# Patient Record
Sex: FEMALE | Race: WHITE | NOT HISPANIC OR LATINO | Employment: FULL TIME | URBAN - METROPOLITAN AREA
[De-identification: names, ages, dates, MRNs, and addresses within clinical notes are randomized per-mention and may not be internally consistent; named-entity substitution may affect disease eponyms.]

---

## 2017-03-30 ENCOUNTER — TRANSCRIBE ORDERS (OUTPATIENT)
Dept: ADMINISTRATIVE | Facility: HOSPITAL | Age: 47
End: 2017-03-30

## 2017-03-30 ENCOUNTER — HOSPITAL ENCOUNTER (OUTPATIENT)
Dept: RADIOLOGY | Facility: HOSPITAL | Age: 47
Discharge: HOME | End: 2017-03-30
Attending: INTERNAL MEDICINE
Payer: COMMERCIAL

## 2017-03-30 ENCOUNTER — APPOINTMENT (OUTPATIENT)
Dept: LAB | Facility: HOSPITAL | Age: 47
End: 2017-03-30
Attending: INTERNAL MEDICINE
Payer: COMMERCIAL

## 2017-03-30 DIAGNOSIS — R06.00 DYSPNEA, UNSPECIFIED TYPE: ICD-10-CM

## 2017-03-30 DIAGNOSIS — R06.02 SOB (SHORTNESS OF BREATH): ICD-10-CM

## 2017-03-30 LAB
ATRIAL RATE: 82 BPM
P AXIS: 42 DEGREES
PR INTERVAL: 152 MS
QRS AXIS: 5 DEGREES
QRSD INTERVAL: 80 MS
QT INTERVAL: 376 MS
QTC INTERVAL: 439 MS
T WAVE AXIS: 44 DEGREES
VENTRICULAR RATE: 82 BPM

## 2017-03-30 PROCEDURE — 71020 HB CHEST X-RAY 2VW FRONTAL&LATL: CPT

## 2017-03-30 PROCEDURE — 93005 ELECTROCARDIOGRAM TRACING: CPT

## 2017-04-06 ENCOUNTER — HOSPITAL ENCOUNTER (OUTPATIENT)
Dept: PULMONOLOGY | Facility: HOSPITAL | Age: 47
Discharge: HOME | End: 2017-04-06
Attending: INTERNAL MEDICINE
Payer: COMMERCIAL

## 2017-04-06 ENCOUNTER — HOSPITAL ENCOUNTER (OUTPATIENT)
Dept: NON INVASIVE DIAGNOSTICS | Facility: HOSPITAL | Age: 47
Discharge: HOME | End: 2017-04-06
Attending: INTERNAL MEDICINE
Payer: COMMERCIAL

## 2017-04-06 ENCOUNTER — GENERIC CONVERSION - ENCOUNTER (OUTPATIENT)
Dept: OTHER | Facility: OTHER | Age: 47
End: 2017-04-06

## 2017-04-06 ENCOUNTER — HOSPITAL ENCOUNTER (OUTPATIENT)
Dept: RADIOLOGY | Facility: HOSPITAL | Age: 47
Discharge: HOME | End: 2017-04-06
Attending: INTERNAL MEDICINE
Payer: COMMERCIAL

## 2017-04-06 DIAGNOSIS — R06.00 DYSPNEA, UNSPECIFIED TYPE: ICD-10-CM

## 2017-04-06 DIAGNOSIS — R06.02 SOB (SHORTNESS OF BREATH): ICD-10-CM

## 2017-04-06 PROCEDURE — 94060 EVALUATION OF WHEEZING: CPT

## 2017-04-06 PROCEDURE — 93306 TTE W/DOPPLER COMPLETE: CPT

## 2017-04-06 PROCEDURE — 71260 CT THORAX DX C+: CPT

## 2017-04-06 PROCEDURE — 94729 DIFFUSING CAPACITY: CPT

## 2017-04-06 PROCEDURE — 94726 PLETHYSMOGRAPHY LUNG VOLUMES: CPT

## 2017-04-06 PROCEDURE — 94760 N-INVAS EAR/PLS OXIMETRY 1: CPT

## 2017-04-06 RX ADMIN — IOHEXOL 85 ML: 350 INJECTION, SOLUTION INTRAVENOUS at 08:52

## 2017-11-01 ENCOUNTER — TRANSCRIBE ORDERS (OUTPATIENT)
Dept: ADMINISTRATIVE | Facility: HOSPITAL | Age: 47
End: 2017-11-01

## 2017-11-01 DIAGNOSIS — Z12.31 ENCOUNTER FOR SCREENING MAMMOGRAM FOR MALIGNANT NEOPLASM OF BREAST: Primary | ICD-10-CM

## 2017-12-27 ENCOUNTER — GENERIC CONVERSION - ENCOUNTER (OUTPATIENT)
Dept: OTHER | Facility: OTHER | Age: 47
End: 2017-12-27

## 2017-12-27 ENCOUNTER — HOSPITAL ENCOUNTER (OUTPATIENT)
Dept: RADIOLOGY | Facility: HOSPITAL | Age: 47
Discharge: HOME | End: 2017-12-27
Payer: COMMERCIAL

## 2017-12-27 DIAGNOSIS — Z12.31 ENCOUNTER FOR SCREENING MAMMOGRAM FOR MALIGNANT NEOPLASM OF BREAST: ICD-10-CM

## 2017-12-27 PROCEDURE — G0202 SCR MAMMO BI INCL CAD: HCPCS

## 2018-11-12 ENCOUNTER — TRANSCRIBE ORDERS (OUTPATIENT)
Dept: ADMINISTRATIVE | Facility: HOSPITAL | Age: 48
End: 2018-11-12

## 2018-11-12 DIAGNOSIS — Z12.39 SCREENING BREAST EXAMINATION: Primary | ICD-10-CM

## 2018-12-28 ENCOUNTER — HOSPITAL ENCOUNTER (OUTPATIENT)
Dept: RADIOLOGY | Facility: HOSPITAL | Age: 48
Discharge: HOME | End: 2018-12-28
Attending: INTERNAL MEDICINE
Payer: COMMERCIAL

## 2018-12-28 VITALS — BODY MASS INDEX: 26.43 KG/M2 | WEIGHT: 140 LBS | HEIGHT: 61 IN

## 2018-12-28 DIAGNOSIS — Z12.39 SCREENING BREAST EXAMINATION: ICD-10-CM

## 2018-12-28 PROCEDURE — 77067 SCR MAMMO BI INCL CAD: CPT

## 2019-06-21 ENCOUNTER — TRANSCRIBE ORDERS (OUTPATIENT)
Dept: ADMINISTRATIVE | Facility: HOSPITAL | Age: 49
End: 2019-06-21

## 2019-07-29 ENCOUNTER — OFFICE VISIT (OUTPATIENT)
Dept: AUDIOLOGY | Facility: CLINIC | Age: 49
End: 2019-07-29
Payer: COMMERCIAL

## 2019-07-29 DIAGNOSIS — R42 DIZZINESS: Primary | ICD-10-CM

## 2019-07-29 PROCEDURE — 92540 BASIC VESTIBULAR EVALUATION: CPT | Performed by: AUDIOLOGIST

## 2019-07-29 PROCEDURE — 92537 CALORIC VSTBLR TEST W/REC: CPT | Performed by: AUDIOLOGIST

## 2019-07-29 NOTE — PROGRESS NOTES
Videonystagmography (VNG) Evaluation    Name:  Alxeis Monteiro  :  1970  Age:  51 y o  Date of Evaluation: 19     History:  5-6 months of vertigo with position changes (noted while exercising)  Treatment of increased migraines with Topamax  The migraines have only been present the past few years  Reason for visit: Alexis Monteiro is seen today at the request of Dr Arturo Coughlin (ENT) for an evaluation of balance  Patient complains of periods of vertigo while turning or lying back in bed  Currently, the symptoms will last seconds directly after moving into a position  When she has attempted to exercise, she is unable to move "for a few hours" because of the dizziness (she has stopped exercising as a result)  Her first episode was the "longest and most severe" lasting several hours with residual lightheadedness the following day  A recent MRI revealed a normal study  Otoscopy revealed clear external auditory canals prior to testing  A recent audiogram, performed at Dr Debra Fishman office, revealed normal peripheral hearing sensitivity bilaterally with normal middle ear functioning  Oculomotor battery:    Gaze:          Right: Normal        Left: Normal         Up: Normal        Down: Normal    Center:  Normal      Random Saccades: Normal     Tracking: Normal     Optokinetic: Normal    Positioning/Positionals:     Delorise Georgis:    Right: Positive  Head Right, upon initial positioning the velocity was 15 degrees L beating, fixation was normal   Patient did not feel significant symptoms in this position even in the presence of the nystagmus        Left: Negative        Positionals:     Sitting: Normal    Supine: 4 degrees Left beating    Head Right: Abnormal Left Beat 11 degree and Clinically Significant , fixation normal     Head Left: Abnormal Right Beat 13 degree, Clinically Signficant, fixation normal     Body Right: Abnormal Left Beat 18 degree and Clinically Significant fixation normal     Body Left[de-identified] Abnormal Right Beat 14 degree and Clinically Significant fixation normal    Ageotropic nystagmus in all head/body positions  Calorics:     Bithermal Caloric Irrigation: Unilateral weakness, right ear, 64% / Clinically significant     Notes: Positioning did not elicit significant symptoms  She noted that she felt slightly dizzy as soon as she moved, but then it subsided  Recommendations:    1  Consider vestibular physcial therapy evaluation and rehabilitation through [facility] provided to the patient  2  Follow up with Dr Anel Reyez regarding today's test results          Hilaria Aguilar , CCC-A, [place]# [id number], Hearing Aid Dispenser, [place]# [id number]  Clinical Audiologist

## 2019-07-29 NOTE — LETTER
2019     James Munoz MD  [facility] [address]  [address] [address]    Patient: Sade Conti   YOB: 1970   Date of Visit: 2019       Dear Dr Elisha Mcclendon: Thank you for referring Avani Darby to me for evaluation  Below are my notes for this consultation  If you have questions, please do not hesitate to call me  I look forward to following your patient along with you  Sincerely,        Hilaria Pickens , CCC/A  Clinical Audiologist   [place] [id number]        CC: MD Ry Sosa  2019 11:22 AM  Sign at close encounter  Videonystagmography (VNG) Evaluation    Name:  Sade Conti  :  1970  Age:  51 y o  Date of Evaluation: 19     History:  5-6 months of vertigo with position changes (noted while exercising)  Treatment of increased migraines with Topamax  The migraines have only been present the past few years  Reason for visit: Sade Conti is seen today at the request of Dr Elisha Mcclendon (ENT) for an evaluation of balance  Patient complains of periods of vertigo while turning or lying back in bed  Currently, the symptoms will last seconds directly after moving into a position  When she has attempted to exercise, she is unable to move "for a few hours" because of the dizziness (she has stopped exercising as a result)  Her first episode was the "longest and most severe" lasting several hours with residual lightheadedness the following day  A recent MRI revealed a normal study  Otoscopy revealed clear external auditory canals prior to testing  A recent audiogram, performed at Dr Robert Razo office, revealed normal peripheral hearing sensitivity bilaterally with normal middle ear functioning        Oculomotor battery:    Gaze:          Right: Normal        Left: Normal         Up: Normal        Down: Normal    Center:  Normal      Random Saccades: Normal     Tracking: Normal     Optokinetic: Normal    Positioning/Positionals:     Tarri Ali:    Right: Positive  Head Right, upon initial positioning the velocity was 15 degrees L beating, fixation was normal   Patient did not feel significant symptoms in this position even in the presence of the nystagmus  Left: Negative        Positionals:     Sitting: Normal    Supine: 4 degrees Left beating    Head Right: Abnormal Left Beat 11 degree and Clinically Significant , fixation normal     Head Left: Abnormal Right Beat 13 degree, Clinically Signficant, fixation normal     Body Right: Abnormal Left Beat 18 degree and Clinically Significant fixation normal     Body Left[de-identified] Abnormal Right Beat 14 degree and Clinically Significant fixation normal    Ageotropic nystagmus in all head/body positions  Calorics:     Bithermal Caloric Irrigation: Unilateral weakness, right ear, 64% / Clinically significant     Notes: Positioning did not elicit significant symptoms  She noted that she felt slightly dizzy as soon as she moved, but then it subsided  Recommendations:    1  Consider vestibular physcial therapy evaluation and rehabilitation through [facility] provided to the patient  2  Follow up with Dr Jonathan Zafar regarding today's test results          Hilaria Butler , CCC-A, [place]# [id number], Hearing Aid Dispenser, [place]# [id number]  Clinical Audiologist

## 2019-08-20 NOTE — PROGRESS NOTES
Please let patient know that VNG showed a right ear weakness  We will send her for vestibular therapy with [facility] or [facility]

## 2019-09-25 ENCOUNTER — EVALUATION (OUTPATIENT)
Dept: PHYSICAL THERAPY | Facility: CLINIC | Age: 49
End: 2019-09-25
Payer: COMMERCIAL

## 2019-09-25 DIAGNOSIS — H81.8X1 RIGHT-SIDED VESTIBULAR WEAKNESS: Primary | ICD-10-CM

## 2019-09-25 PROCEDURE — 97163 PT EVAL HIGH COMPLEX 45 MIN: CPT

## 2019-09-25 NOTE — PROGRESS NOTES
PT Evaluation     Today's date: 2019  Patient name: Juan Renteria  : 1970  MRN: [id number]  Referring provider: Sapphire Quintanilla MD  Dx:   Encounter Diagnosis     ICD-10-CM    1  Right-sided vestibular weakness H81 8X1        Start Time: 1601  Stop Time: 1652  Total time in clinic (min): 51 minutes    Assessment  Assessment details: 51year old female with recent VNG revealing significant R ULW (peripheral vestibular unilateral weakness ) Pt is referred to PT by ENT physician  Pt presents with motion sensitivity and occasional nystagmus during positional testing  Dynamic balance deficits evidenced by reduced FGA score  Pt displays symptoms with VOR activity  Advise continued PT per POC so pt can resume PLOF without sx  Pt is in agreement with this plan  Impairments: activity intolerance, impaired balance and lacks appropriate home exercise program  Other impairment: motion sensitivity    Symptom irritability: moderateUnderstanding of Dx/Px/POC: excellent  Goals  Last meclizine was in 2019  ST-4 wekks  1  Pt will be indep in HEP  2  Complete MSQ  3  Pt will tolerate 60 seconds of VOR x 1 activity without sx to improve tolerance to head movement in ADL's  LTG 4-8 weeks   1  Reduce motion sensitivity by 50-75%  2  Pt will tolerate functional speed VOR x 2 activity x 60 seconds without sx for improved tolerance to functional movement involving gaze  3  Pt will be able to roll R or L supine without sx to resume her exercise program   4  No position or activity of avoidance  5  Full speed motion with all ADL's             Plan  Planned therapy interventions: therapeutic activities, therapeutic exercise, patient education, neuromuscular re-education, home exercise program and balance  Frequency: 2x week  Duration in weeks: 8  Plan of Care beginning date: 2019  Plan of Care expiration date: 2019  Treatment plan discussed with: patient        Subjective Evaluation    History of Present Illness  Mechanism of injury: 51year old female referred by Dr Marty Bird, ENT  Jan/feb 2019 she was standing/talking to spouse and the  room just started spinning and this lasted 6 hours, she vomited 6-8 times  It has never been that bad  She had a VNG with Astat[facility]: 64% R ULW  She cannot get down on the floor at all  If she moves her head side to side while lyihg on the floor she gets very dizzy and sx take an hour to feel better  She gets brief spinning a few seconds, when she lies back in bed at night  More sx if she turns her head R  She sleeps on her left side  Looking up in the shower causes a spinning spell  Sitting/resting improves sx  Driving is going well  While sitting she has left sx  Lareg movements, such as exercising she feels symptoms  Balance:  Walking around, walking on TM/elliptical     Pain  No pain reported    Social Support  Steps to enter house: yes (rail)  Stairs in house: yes (railing)   Lives in: multiple-level home  Lives with: spouse    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[place])  Treatments  No previous or current treatments  Patient Goals  Patient goals for therapy: improved balance  Patient goal: reduce/eliminate dizziness  Be able to exercise on the floor  Objective     Functional Assessment        Comments  Last meclizine was in July 2019  Coordination:   Finger to nose: intact   Heel to shin:  Intact   CHERI:  UE:intact      LE:intact   Strength:  Hip flexion:  R=5L=5  Quads:  R = 5   L = 5  Ankles: R=5  L=5  Oculomotor:  SP: intact no sx  Saccades:intact no sx   Head thrust: negative  No sx  VOR x 1: 60 seconds, standing FA   Horizontal:  Mild dizzy by 30 seconds  Vertical: mild dizzy by 16 seconds       REO:30 sec   REC:30 sec   SREO:30 seconds   SREC:12 seconds   SLS:30 sec each leg   FTEO foam: 30 sec   FTEC foam: 30 sec     Gait speed:8 57 sec over 10m= 1 16 m/sec or  3 82 ft/sec FGA: 25/30     5XSTS:10 56 sec     Positionals: 4/10 upon lying supine for 15 seconds  Right Roll test: 1-2 low intensity beats ageotropic nystagmus   Left Roll test: negative, no dizziness  Return to sitting: brief spinning no nystagmus room light  Right DixHallpike: negative  Left DixHallpike: negative   Return to sit: mild dizziness no nystagmus  Habituation R rolling: added to HEP, very mild dizziness  Noted         Flowsheet Rows      Most Recent Value   PT/OT G-Codes   FOTO information reviewed  N/A             Precautions:   Past Medical History:   Diagnosis Date    Hyperlipidemia     Migraine 2017         Manual                                                                                   Exercise Diary                                                                                                                                                                                                                                                                                      Modalities

## 2019-09-25 NOTE — LETTER
2019    Young Castro MD  [address] [address]    Patient: Aparna Diaz   YOB: 1970   Date of Visit: 2019     Encounter Diagnosis     ICD-10-CM    1  Right-sided vestibular weakness H81 8X1        Dear Dr Aguilera Larger: Thank you for your recent referral of Aparna Diaz  Please review the attached evaluation summary from Maki's recent visit  Please verify that you agree with the plan of care by signing the attached order  If you have any questions or concerns, please do not hesitate to call  I sincerely appreciate the opportunity to share in the care of one of your patients and hope to have another opportunity to work with you in the near future  Sincerely,    Chad Webster, PT      Referring Provider:      I certify that I have read the below Plan of Care and certify the need for these services furnished under this plan of treatment while under my care  Young Castro MD  [facility] [address]: [phone]          PT Evaluation     Today's date: 2019  Patient name: Aparna Diaz  : 1970  MRN: [id number]  Referring provider: Syl Mccarthy MD  Dx:   Encounter Diagnosis     ICD-10-CM    1  Right-sided vestibular weakness H81 8X1        Start Time: 1601  Stop Time: 1652  Total time in clinic (min): 51 minutes    Assessment  Assessment details: 51year old female with recent VNG revealing significant R ULW (peripheral vestibular unilateral weakness ) Pt is referred to PT by ENT physician  Pt presents with motion sensitivity and occasional nystagmus during positional testing  Dynamic balance deficits evidenced by reduced FGA score  Pt displays symptoms with VOR activity  Advise continued PT per POC so pt can resume PLOF without sx  Pt is in agreement with this plan         Impairments: activity intolerance, impaired balance and lacks appropriate home exercise program  Other impairment: motion sensitivity    Symptom irritability: moderateUnderstanding of Dx/Px/POC: excellent  Goals  Last meclizine was in 2019  ST-4 wekks  1  Pt will be indep in HEP  2  Complete MSQ  3  Pt will tolerate 60 seconds of VOR x 1 activity without sx to improve tolerance to head movement in ADL's  LTG 4-8 weeks   1  Reduce motion sensitivity by 50-75%  2  Pt will tolerate functional speed VOR x 2 activity x 60 seconds without sx for improved tolerance to functional movement involving gaze  3  Pt will be able to roll R or L supine without sx to resume her exercise program   4  No position or activity of avoidance  5  Full speed motion with all ADL's  Plan  Planned therapy interventions: therapeutic activities, therapeutic exercise, patient education, neuromuscular re-education, home exercise program and balance  Frequency: 2x week  Duration in weeks: 8  Plan of Care beginning date: 2019  Plan of Care expiration date: 2019  Treatment plan discussed with: patient        Subjective Evaluation    History of Present Illness  Mechanism of injury: 51year old female referred by Dr Arturo Coughlin, ENT  /2019 she was standing/talking to spouse and the  room just started spinning and this lasted 6 hours, she vomited 6-8 times  It has never been that bad  She had a VNG with [facility]: 64% R ULW  She cannot get down on the floor at all  If she moves her head side to side while lyihg on the floor she gets very dizzy and sx take an hour to feel better  She gets brief spinning a few seconds, when she lies back in bed at night  More sx if she turns her head R  She sleeps on her left side  Looking up in the shower causes a spinning spell  Sitting/resting improves sx  Driving is going well  While sitting she has left sx  Lareg movements, such as exercising she feels symptoms      Balance:  Walking around, walking on TM/elliptical     Pain  No pain reported    Social Support  Steps to enter house: yes (rail)  Stairs in house: yes (railing)   Lives in: multiple-level home  Lives with: spouse    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[place])  Treatments  No previous or current treatments  Patient Goals  Patient goals for therapy: improved balance  Patient goal: reduce/eliminate dizziness  Be able to exercise on the floor  Objective     Functional Assessment        Comments  Last meclizine was in July 2019  Coordination:   Finger to nose: intact   Heel to shin:  Intact   CHERI:  UE:intact      LE:intact   Strength:  Hip flexion:  R=5L=5  Quads:  R = 5   L = 5  Ankles: R=5  L=5  Oculomotor:  SP: intact no sx  Saccades:intact no sx   Head thrust: negative  No sx  VOR x 1: 60 seconds, standing FA   Horizontal:  Mild dizzy by 30 seconds  Vertical: mild dizzy by 16 seconds  REO:30 sec   REC:30 sec   SREO:30 seconds   SREC:12 seconds   SLS:30 sec each leg   FTEO foam: 30 sec   FTEC foam: 30 sec     Gait speed:8 57 sec over 10m= 1 16 m/sec or  3 82 ft/sec   FGA: 25/30     5XSTS:10 56 sec     Positionals: 4/10 upon lying supine for 15 seconds  Right Roll test: 1-2 low intensity beats ageotropic nystagmus   Left Roll test: negative, no dizziness  Return to sitting: brief spinning no nystagmus room light  Right DixHallpike: negative  Left DixHallpike: negative   Return to sit: mild dizziness no nystagmus  Habituation R rolling: added to HEP, very mild dizziness  Noted         Flowsheet Rows      Most Recent Value   PT/OT G-Codes   FOTO information reviewed  N/A             Precautions:   Past Medical History:   Diagnosis Date    Hyperlipidemia     Migraine 2017         Manual                                                                                   Exercise Diary Modalities

## 2019-10-01 ENCOUNTER — TRANSCRIBE ORDERS (OUTPATIENT)
Dept: PHYSICAL THERAPY | Facility: CLINIC | Age: 49
End: 2019-10-01

## 2019-10-01 ENCOUNTER — OFFICE VISIT (OUTPATIENT)
Dept: PHYSICAL THERAPY | Facility: CLINIC | Age: 49
End: 2019-10-01
Payer: COMMERCIAL

## 2019-10-01 DIAGNOSIS — H81.8X1 RIGHT-SIDED VESTIBULAR WEAKNESS: Primary | ICD-10-CM

## 2019-10-01 PROCEDURE — 97112 NEUROMUSCULAR REEDUCATION: CPT | Performed by: PHYSICAL THERAPIST

## 2019-10-03 ENCOUNTER — TELEPHONE (OUTPATIENT)
Dept: PHYSICAL THERAPY | Facility: CLINIC | Age: 49
End: 2019-10-03

## 2019-10-03 NOTE — TELEPHONE ENCOUNTER
Left voice message asking pt to call back since she cancelled all appts and told [address] [place] she was too busy and would not be returning

## 2019-10-07 ENCOUNTER — APPOINTMENT (OUTPATIENT)
Dept: PHYSICAL THERAPY | Facility: CLINIC | Age: 49
End: 2019-10-07
Payer: COMMERCIAL

## 2019-10-09 ENCOUNTER — APPOINTMENT (OUTPATIENT)
Dept: PHYSICAL THERAPY | Facility: CLINIC | Age: 49
End: 2019-10-09
Payer: COMMERCIAL

## 2019-10-28 NOTE — PROGRESS NOTES
PT DISCHARGE NOTE 10/28/19    Pt called 10/2/19 to cancel remaining appts, stating her schedule is too full to attend PT  DC from PT at this time  Sx rated 2-3/10 dizziness at last visit  Pt is welcome to return to PT as medically appropriate

## 2019-12-11 ENCOUNTER — TRANSCRIBE ORDERS (OUTPATIENT)
Dept: ADMINISTRATIVE | Facility: HOSPITAL | Age: 49
End: 2019-12-11

## 2019-12-11 DIAGNOSIS — Z12.31 ENCOUNTER FOR SCREENING MAMMOGRAM FOR MALIGNANT NEOPLASM OF BREAST: Primary | ICD-10-CM

## 2020-02-06 ENCOUNTER — HOSPITAL ENCOUNTER (EMERGENCY)
Facility: HOSPITAL | Age: 50
Discharge: HOME | End: 2020-02-06
Attending: EMERGENCY MEDICINE
Payer: COMMERCIAL

## 2020-02-06 ENCOUNTER — APPOINTMENT (EMERGENCY)
Dept: RADIOLOGY | Facility: HOSPITAL | Age: 50
End: 2020-02-06
Payer: COMMERCIAL

## 2020-02-06 VITALS
HEART RATE: 86 BPM | RESPIRATION RATE: 18 BRPM | OXYGEN SATURATION: 100 % | WEIGHT: 135 LBS | DIASTOLIC BLOOD PRESSURE: 73 MMHG | TEMPERATURE: 99.4 F | SYSTOLIC BLOOD PRESSURE: 130 MMHG | BODY MASS INDEX: 25.51 KG/M2

## 2020-02-06 DIAGNOSIS — R07.81 PLEURITIC CHEST PAIN: Primary | ICD-10-CM

## 2020-02-06 DIAGNOSIS — J40 BRONCHITIS: ICD-10-CM

## 2020-02-06 LAB
ALBUMIN SERPL BCP-MCNC: 3.7 G/DL (ref 3.5–5)
ALP SERPL-CCNC: 91 U/L (ref 46–116)
ALT SERPL W P-5'-P-CCNC: 34 U/L (ref 12–78)
ANION GAP SERPL CALCULATED.3IONS-SCNC: 10 MMOL/L (ref 4–13)
APTT PPP: 29 SECONDS (ref 23–37)
AST SERPL W P-5'-P-CCNC: 18 U/L (ref 5–45)
BASOPHILS # BLD AUTO: 0.01 THOUSANDS/ΜL (ref 0–0.1)
BASOPHILS NFR BLD AUTO: 0 % (ref 0–1)
BILIRUB SERPL-MCNC: 0.3 MG/DL (ref 0.2–1)
BUN SERPL-MCNC: 14 MG/DL (ref 5–25)
CALCIUM SERPL-MCNC: 8.6 MG/DL (ref 8.3–10.1)
CHLORIDE SERPL-SCNC: 105 MMOL/L (ref 100–108)
CO2 SERPL-SCNC: 23 MMOL/L (ref 21–32)
CREAT SERPL-MCNC: 0.65 MG/DL (ref 0.6–1.3)
EOSINOPHIL # BLD AUTO: 0.09 THOUSAND/ΜL (ref 0–0.61)
EOSINOPHIL NFR BLD AUTO: 2 % (ref 0–6)
ERYTHROCYTE [DISTWIDTH] IN BLOOD BY AUTOMATED COUNT: 11.8 % (ref 11.6–15.1)
GFR SERPL CREATININE-BSD FRML MDRD: 105 ML/MIN/1.73SQ M
GLUCOSE SERPL-MCNC: 94 MG/DL (ref 65–140)
HCT VFR BLD AUTO: 40.6 % (ref 34.8–46.1)
HGB BLD-MCNC: 13.5 G/DL (ref 11.5–15.4)
IMM GRANULOCYTES # BLD AUTO: 0.01 THOUSAND/UL (ref 0–0.2)
IMM GRANULOCYTES NFR BLD AUTO: 0 % (ref 0–2)
INR PPP: 0.92 (ref 0.91–1.09)
LYMPHOCYTES # BLD AUTO: 1.25 THOUSANDS/ΜL (ref 0.6–4.47)
LYMPHOCYTES NFR BLD AUTO: 21 % (ref 14–44)
MCH RBC QN AUTO: 29.6 PG (ref 26.8–34.3)
MCHC RBC AUTO-ENTMCNC: 33.3 G/DL (ref 31.4–37.4)
MCV RBC AUTO: 89 FL (ref 82–98)
MONOCYTES # BLD AUTO: 0.43 THOUSAND/ΜL (ref 0.17–1.22)
MONOCYTES NFR BLD AUTO: 7 % (ref 4–12)
NEUTROPHILS # BLD AUTO: 4.11 THOUSANDS/ΜL (ref 1.85–7.62)
NEUTS SEG NFR BLD AUTO: 70 % (ref 43–75)
NRBC BLD AUTO-RTO: 0 /100 WBCS
PLATELET # BLD AUTO: 254 THOUSANDS/UL (ref 149–390)
PMV BLD AUTO: 8.9 FL (ref 8.9–12.7)
POTASSIUM SERPL-SCNC: 3.5 MMOL/L (ref 3.5–5.3)
PROT SERPL-MCNC: 7.1 G/DL (ref 6.4–8.2)
PROTHROMBIN TIME: 10 SECONDS (ref 9.8–12)
RBC # BLD AUTO: 4.56 MILLION/UL (ref 3.81–5.12)
SODIUM SERPL-SCNC: 138 MMOL/L (ref 136–145)
TROPONIN I SERPL-MCNC: <0.02 NG/ML
WBC # BLD AUTO: 5.9 THOUSAND/UL (ref 4.31–10.16)

## 2020-02-06 PROCEDURE — 80053 COMPREHEN METABOLIC PANEL: CPT | Performed by: EMERGENCY MEDICINE

## 2020-02-06 PROCEDURE — 85730 THROMBOPLASTIN TIME PARTIAL: CPT | Performed by: EMERGENCY MEDICINE

## 2020-02-06 PROCEDURE — 36415 COLL VENOUS BLD VENIPUNCTURE: CPT | Performed by: EMERGENCY MEDICINE

## 2020-02-06 PROCEDURE — 96361 HYDRATE IV INFUSION ADD-ON: CPT

## 2020-02-06 PROCEDURE — 71046 X-RAY EXAM CHEST 2 VIEWS: CPT

## 2020-02-06 PROCEDURE — 85025 COMPLETE CBC W/AUTO DIFF WBC: CPT | Performed by: EMERGENCY MEDICINE

## 2020-02-06 PROCEDURE — 93005 ELECTROCARDIOGRAM TRACING: CPT

## 2020-02-06 PROCEDURE — 85610 PROTHROMBIN TIME: CPT | Performed by: EMERGENCY MEDICINE

## 2020-02-06 PROCEDURE — 96360 HYDRATION IV INFUSION INIT: CPT

## 2020-02-06 PROCEDURE — 84484 ASSAY OF TROPONIN QUANT: CPT | Performed by: EMERGENCY MEDICINE

## 2020-02-06 PROCEDURE — 99284 EMERGENCY DEPT VISIT MOD MDM: CPT | Performed by: EMERGENCY MEDICINE

## 2020-02-06 PROCEDURE — 99285 EMERGENCY DEPT VISIT HI MDM: CPT

## 2020-02-06 PROCEDURE — 71275 CT ANGIOGRAPHY CHEST: CPT

## 2020-02-06 RX ORDER — PREDNISONE 20 MG/1
20 TABLET ORAL 2 TIMES DAILY
Qty: 20 TABLET | Refills: 0 | Status: SHIPPED | OUTPATIENT
Start: 2020-02-06 | End: 2020-02-16

## 2020-02-06 RX ADMIN — SODIUM CHLORIDE 1000 ML: 0.9 INJECTION, SOLUTION INTRAVENOUS at 13:54

## 2020-02-06 RX ADMIN — IOHEXOL 85 ML: 350 INJECTION, SOLUTION INTRAVENOUS at 14:31

## 2020-02-06 NOTE — ED PROVIDER NOTES
History  Chief Complaint   Patient presents with    Chest Pain     patient c/o midsternal chest pain x several days with breathing and certain position changes  sent in by PMD     Sore Throat     also c/o sore throat     Patient states she has been having midsternal sharp chest pain which is worse with breathing for several days  She has mild congestion in the sore throat, in a mild cough  She has not had a fever  Patient states the pain in breathing improved with certain positions  She has not had hemoptysis  Patient was seen by her PMD today who center in for a x-ray, followed by ED evaluation  Patient has no significant cardiac history, has no personal history of DVT but does have a family history of it  Chest x-ray was reviewed by me on her arrival          Prior to Admission Medications   Prescriptions Last Dose Informant Patient Reported? Taking? NON FORMULARY   Yes Yes   Sig: CBD oil   eszopiclone (LUNESTA) 2 mg tablet   Yes Yes   Sig: TK 1 T PO  HS PRN   meclizine (ANTIVERT) 25 mg tablet   Yes Yes   ondansetron (ZOFRAN) 4 mg tablet   Yes Yes   rosuvastatin (CRESTOR) 10 MG tablet   Yes Yes   Sig: Take 10 mg by mouth daily   topiramate (TOPAMAX) 50 MG tablet   Yes Yes   Sig: take 1 tablet by mouth twice a day      Facility-Administered Medications: None       Past Medical History:   Diagnosis Date    Hyperlipidemia     Migraine        Past Surgical History:   Procedure Laterality Date    AUGMENTATION MAMMAPLASTY Bilateral     BREAST BIOPSY Right 2001     SECTION         Family History   Problem Relation Age of Onset    Breast cancer Cousin 39    Breast cancer Maternal Aunt 61    Cancer Father     Diabetes Father      I have reviewed and agree with the history as documented      Social History     Tobacco Use    Smoking status: Former Smoker     Packs/day: 1 00     Years: 20 00     Pack years: 20 00     Last attempt to quit: 2015     Years since quittin 3    Smokeless tobacco: Never Used   Substance Use Topics    Alcohol use: Yes     Alcohol/week: 0 0 standard drinks     Comment: 1 drink per month    Drug use: Never        Review of Systems   Constitutional: Negative for chills and fever  HENT: Positive for congestion and sore throat  Eyes: Negative for visual disturbance  Respiratory: Positive for cough and shortness of breath  Negative for wheezing  Cardiovascular: Positive for chest pain  Negative for palpitations and leg swelling  Gastrointestinal: Negative for abdominal pain and vomiting  Genitourinary: Negative for dysuria  Musculoskeletal: Negative for back pain  Skin: Negative for rash  Neurological: Negative for headaches  Psychiatric/Behavioral: Negative for confusion  All other systems reviewed and are negative  Physical Exam  Physical Exam   Constitutional: She is oriented to person, place, and time  She appears well-developed and well-nourished  HENT:   Head: Normocephalic and atraumatic  Eyes: EOM are normal    Neck: Normal range of motion  Neck supple  Cardiovascular: Normal rate, regular rhythm, intact distal pulses and normal pulses  Pulmonary/Chest: Effort normal and breath sounds normal  She has no decreased breath sounds  She has no wheezes  She has no rales  Abdominal: Soft  Bowel sounds are normal  There is no tenderness  Musculoskeletal:        Right lower leg: Normal  She exhibits no edema  Left lower leg: Normal  She exhibits no edema  Neurological: She is alert and oriented to person, place, and time  Skin: Skin is warm and dry  Capillary refill takes less than 2 seconds  Psychiatric: She has a normal mood and affect  Her behavior is normal    Nursing note and vitals reviewed        Vital Signs  ED Triage Vitals [02/06/20 1221]   Temperature Pulse Respirations Blood Pressure SpO2   99 4 °F (37 4 °C) 86 18 130/73 100 %      Temp Source Heart Rate Source Patient Position - Orthostatic VS BP Location FiO2 (%)   Tympanic Monitor Sitting Right arm --      Pain Score       5           Vitals:    02/06/20 1221   BP: 130/73   Pulse: 86   Patient Position - Orthostatic VS: Sitting         Visual Acuity      ED Medications  Medications   sodium chloride 0 9 % bolus 1,000 mL (0 mL Intravenous Stopped 2/6/20 1539)   iohexol (OMNIPAQUE) 350 MG/ML injection (MULTI-DOSE) 85 mL (85 mL Intravenous Given 2/6/20 1431)       Diagnostic Studies  Results Reviewed     Procedure Component Value Units Date/Time    Troponin I [[id number]]  (Normal) Collected:  02/06/20 1353    Lab Status:  Final result Specimen:  Blood from Arm, Left Updated:  02/06/20 1420     Troponin I <0 02 ng/mL     Protime-INR [[id number]]  (Normal) Collected:  02/06/20 1353    Lab Status:  Final result Specimen:  Blood from Arm, Left Updated:  02/06/20 1416     Protime 10 0 seconds      INR 0 92    APTT [[id number]]  (Normal) Collected:  02/06/20 1353    Lab Status:  Final result Specimen:  Blood from Arm, Left Updated:  02/06/20 1416     PTT 29 seconds     Comprehensive metabolic panel [[id number]] Collected:  02/06/20 1353    Lab Status:  Final result Specimen:  Blood from Arm, Left Updated:  02/06/20 1416     Sodium 138 mmol/L      Potassium 3 5 mmol/L      Chloride 105 mmol/L      CO2 23 mmol/L      ANION GAP 10 mmol/L      BUN 14 mg/dL      Creatinine 0 65 mg/dL      Glucose 94 mg/dL      Calcium 8 6 mg/dL      AST 18 U/L      ALT 34 U/L      Alkaline Phosphatase 91 U/L      Total Protein 7 1 g/dL      Albumin 3 7 g/dL      Total Bilirubin 0 30 mg/dL      eGFR 105 ml/min/1 73sq m     Narrative:       [place] guidelines for Chronic Kidney Disease (CKD):     Stage 1 with normal or high GFR (GFR > 90 mL/min/1 73 square meters)    Stage 2 Mild CKD (GFR = 60-89 mL/min/1 73 square meters)    Stage 3A Moderate CKD (GFR = 45-59 mL/min/1 73 square meters)    Stage 3B Moderate CKD (GFR = 30-44 mL/min/1 73 square meters)    Stage 4 Severe CKD (GFR = 15-29 mL/min/1 73 square meters)    Stage 5 End Stage CKD (GFR <15 mL/min/1 73 square meters)  Note: GFR calculation is accurate only with a steady state creatinine    CBC and differential [[id number]] Collected:  02/06/20 1353    Lab Status:  Final result Specimen:  Blood from Arm, Left Updated:  02/06/20 1359     WBC 5 90 Thousand/uL      RBC 4 56 Million/uL      Hemoglobin 13 5 g/dL      Hematocrit 40 6 %      MCV 89 fL      MCH 29 6 pg      MCHC 33 3 g/dL      RDW 11 8 %      MPV 8 9 fL      Platelets 413 Thousands/uL      nRBC 0 /100 WBCs      Neutrophils Relative 70 %      Immat GRANS % 0 %      Lymphocytes Relative 21 %      Monocytes Relative 7 %      Eosinophils Relative 2 %      Basophils Relative 0 %      Neutrophils Absolute 4 11 Thousands/µL      Immature Grans Absolute 0 01 Thousand/uL      Lymphocytes Absolute 1 25 Thousands/µL      Monocytes Absolute 0 43 Thousand/µL      Eosinophils Absolute 0 09 Thousand/µL      Basophils Absolute 0 01 Thousands/µL                  CTA ED chest PE study   Final Result by Neva Rodríguez MD (02/06 4520)         1  No acute pulmonary embolus  2   Stable benign subcentimeter nodules  Workstation performed: [id number]         XR chest 2 views   Final Result by Serena Velasquez MD (02/06 9794)      No acute cardiopulmonary disease              Workstation performed: [id number]                    Procedures  ECG 12 Lead Documentation Only  Date/Time: 2/6/2020 12:23 PM  Performed by: Cherylene Arena, MD  Authorized by: Cherylene Arena, MD     Indications / Diagnosis:  Chest pain  ECG reviewed by me, the ED Provider: yes    Patient location:  ED  Interpretation:     Interpretation: normal    Rate:     ECG rate:  94    ECG rate assessment: normal    Rhythm:     Rhythm: sinus rhythm    Ectopy:     Ectopy: none    QRS:     QRS axis:  Normal    QRS intervals:  Normal  Conduction:     Conduction: normal    ST segments:     ST segments:  Normal  T waves: T waves: normal    Other findings:     Other findings comment:  Low voltage             ED Course         HEART Risk Score      Most Recent Value   History  0 Filed at: 02/06/2020 1532   ECG  0 Filed at: 02/06/2020 1532   Age  1 Filed at: 02/06/2020 1532   Risk Factors  1 Filed at: 02/06/2020 1532   Troponin  0 Filed at: 02/06/2020 1532   Heart Score Risk Calculator   History  0 Filed at: 02/06/2020 1532   ECG  0 Filed at: 02/06/2020 1532   Age  1 Filed at: 02/06/2020 1532   Risk Factors  1 Filed at: 02/06/2020 1532   Troponin  0 Filed at: 02/06/2020 1532   HEART Score  2 Filed at: 02/06/2020 1532   HEART Score  2 Filed at: 02/06/2020 1532                      Wells' Criteria for PE      Most Recent Value   Wells' Criteria for PE   Clinical signs and symptoms of DVT  3 Filed at: 02/06/2020 1318   PE is primary diagnosis or equally likely  3 Filed at: 02/06/2020 1318   HR >100  0 Filed at: 02/06/2020 1318   Immobilization at least 3 days or Surgery in the previous 4 weeks  0 Filed at: 02/06/2020 1318   Previous, objectively diagnosed PE or DVT  0 Filed at: 02/06/2020 1318   Hemoptysis  0 Filed at: 02/06/2020 1318   Malignancy with treatment within 6 months or palliative  0 Filed at: 02/06/2020 1318   Wells' Criteria Total  6 Filed at: 02/06/2020 1318            [facility]  Number of Diagnoses or Management Options  Bronchitis:   Pleuritic chest pain:   Diagnosis management comments: Patient has pleuritic chest pain without significant recent pulmonary infection  She is a former smoker and has a family history of DVT    Will check a CTA        Disposition  Final diagnoses:   Pleuritic chest pain   Bronchitis     Time reflects when diagnosis was documented in both MDM as applicable and the Disposition within this note     Time User Action Codes Description Comment    2/6/2020  3:33 PM Jenelle Magdaleno Add [R07 81] Pleuritic chest pain     2/6/2020  3:33 PM [place], [address] Bronchitis       ED Disposition     ED Disposition Condition Date/Time Comment    Discharge Stable Thu Feb 6, 2020  3:32 PM Osiris Watson discharge to home/self care  Follow-up Information     Follow up With Specialties Details Why Contact Info    Manjula Jaimes MD Internal Medicine Schedule an appointment as soon as possible for a visit   [address]  [phone]            Discharge Medication List as of 2/6/2020  3:34 PM      START taking these medications    Details   predniSONE 20 mg tablet Take 1 tablet (20 mg total) by mouth 2 (two) times a day for 10 days, Starting Thu 2/6/2020, Until Sun 2/16/2020, Normal         CONTINUE these medications which have NOT CHANGED    Details   eszopiclone (LUNESTA) 2 mg tablet TK 1 T PO  HS PRN, Historical Med      meclizine (ANTIVERT) 25 mg tablet Starting Wed 5/8/2019, Historical Med      NON FORMULARY CBD oil, Historical Med      ondansetron (ZOFRAN) 4 mg tablet Starting Wed 5/8/2019, Historical Med      rosuvastatin (CRESTOR) 10 MG tablet Take 10 mg by mouth daily, Historical Med      topiramate (TOPAMAX) 50 MG tablet take 1 tablet by mouth twice a day, Historical Med           No discharge procedures on file      ED Provider  Electronically Signed by           Manoj Fuller MD  02/07/20 8174       Manoj Fuller MD  02/07/20 1822

## 2020-02-09 LAB
ATRIAL RATE: 94 BPM
P AXIS: 53 DEGREES
PR INTERVAL: 152 MS
QRS AXIS: -14 DEGREES
QRSD INTERVAL: 80 MS
QT INTERVAL: 336 MS
QTC INTERVAL: 420 MS
T WAVE AXIS: 61 DEGREES
VENTRICULAR RATE: 94 BPM

## 2020-02-09 PROCEDURE — 93010 ELECTROCARDIOGRAM REPORT: CPT | Performed by: INTERNAL MEDICINE

## 2020-02-14 ENCOUNTER — HOSPITAL ENCOUNTER (OUTPATIENT)
Dept: RADIOLOGY | Facility: HOSPITAL | Age: 50
Discharge: HOME | End: 2020-02-14
Attending: INTERNAL MEDICINE
Payer: COMMERCIAL

## 2020-02-14 VITALS — BODY MASS INDEX: 25.49 KG/M2 | HEIGHT: 61 IN | WEIGHT: 135 LBS

## 2020-02-14 DIAGNOSIS — Z12.31 ENCOUNTER FOR SCREENING MAMMOGRAM FOR MALIGNANT NEOPLASM OF BREAST: ICD-10-CM

## 2020-02-14 PROCEDURE — 77067 SCR MAMMO BI INCL CAD: CPT

## 2020-02-14 PROCEDURE — 77063 BREAST TOMOSYNTHESIS BI: CPT

## 2021-01-05 ENCOUNTER — IMMUNIZATIONS (OUTPATIENT)
Dept: FAMILY MEDICINE CLINIC | Facility: HOSPITAL | Age: 51
End: 2021-01-05

## 2021-01-05 DIAGNOSIS — Z23 ENCOUNTER FOR IMMUNIZATION: ICD-10-CM

## 2021-01-05 PROCEDURE — 91301 SARS-COV-2 / COVID-19 MRNA VACCINE (MODERNA) 100 MCG: CPT

## 2021-01-05 PROCEDURE — 0011A SARS-COV-2 / COVID-19 MRNA VACCINE (MODERNA) 100 MCG: CPT

## 2021-01-29 ENCOUNTER — TRANSCRIBE ORDERS (OUTPATIENT)
Dept: ADMINISTRATIVE | Facility: HOSPITAL | Age: 51
End: 2021-01-29

## 2021-01-29 DIAGNOSIS — Z12.31 ENCOUNTER FOR SCREENING MAMMOGRAM FOR MALIGNANT NEOPLASM OF BREAST: Primary | ICD-10-CM

## 2021-02-04 ENCOUNTER — IMMUNIZATIONS (OUTPATIENT)
Dept: FAMILY MEDICINE CLINIC | Facility: HOSPITAL | Age: 51
End: 2021-02-04

## 2021-02-04 DIAGNOSIS — Z23 ENCOUNTER FOR IMMUNIZATION: Primary | ICD-10-CM

## 2021-02-04 PROCEDURE — 0012A SARS-COV-2 / COVID-19 MRNA VACCINE (MODERNA) 100 MCG: CPT

## 2021-02-04 PROCEDURE — 91301 SARS-COV-2 / COVID-19 MRNA VACCINE (MODERNA) 100 MCG: CPT

## 2021-03-09 ENCOUNTER — HOSPITAL ENCOUNTER (OUTPATIENT)
Dept: RADIOLOGY | Facility: HOSPITAL | Age: 51
Discharge: HOME | End: 2021-03-09
Attending: INTERNAL MEDICINE
Payer: COMMERCIAL

## 2021-03-09 VITALS — WEIGHT: 126 LBS | HEIGHT: 61 IN | BODY MASS INDEX: 23.79 KG/M2

## 2021-03-09 DIAGNOSIS — Z12.31 ENCOUNTER FOR SCREENING MAMMOGRAM FOR MALIGNANT NEOPLASM OF BREAST: ICD-10-CM

## 2021-03-09 PROCEDURE — 77067 SCR MAMMO BI INCL CAD: CPT

## 2021-03-09 PROCEDURE — 77063 BREAST TOMOSYNTHESIS BI: CPT

## 2022-04-13 ENCOUNTER — TELEPHONE (OUTPATIENT)
Dept: GASTROENTEROLOGY | Facility: CLINIC | Age: 52
End: 2022-04-13

## 2022-04-13 NOTE — TELEPHONE ENCOUNTER
Pt is due for a colon with Dr Severo Cocks on 8/19/22 (would like this date!) at [facility] for hx of sessile serrated adenoma polyps/fm hx of colon ca, mother  When speaking to pt in informed her that Aug schedule is not open yet and that I will call her as soon as schedule opens to schedule

## 2022-04-15 ENCOUNTER — HOSPITAL ENCOUNTER (OUTPATIENT)
Dept: RADIOLOGY | Facility: HOSPITAL | Age: 52
Discharge: HOME | End: 2022-04-15
Attending: INTERNAL MEDICINE
Payer: COMMERCIAL

## 2022-04-15 DIAGNOSIS — Z12.31 ENCOUNTER FOR SCREENING MAMMOGRAM FOR MALIGNANT NEOPLASM OF BREAST: ICD-10-CM

## 2022-04-15 PROCEDURE — 77067 SCR MAMMO BI INCL CAD: CPT

## 2022-04-15 PROCEDURE — 77063 BREAST TOMOSYNTHESIS BI: CPT

## 2022-04-28 NOTE — TELEPHONE ENCOUNTER
Aug schedule is open, however, no schedule is available for 8/19/22  Will wait 2 more weeks, if same at that time will call and speak to [facility] at [facility] to be sure that this is correct

## 2022-05-04 NOTE — TELEPHONE ENCOUNTER
Patients GI provider:  Dr Bull Phillips to return call: [phone]    Reason for call: Pt calling to schedule her repeat colonoscopy    Scheduled procedure/appointment date if applicable: Apt/procedure n/a

## 2022-05-05 NOTE — TELEPHONE ENCOUNTER
I lmom informing pt that Dr Chelsea Mckinnon is not at the endoscopy center on 8/19/22 and asked her to please call me back to know if there is another date that she is available  Will call pt in one week to try to get her scheduled

## 2022-05-05 NOTE — TELEPHONE ENCOUNTER
Patient called because she hasn't heard anything about scheduling her colonoscopy on 8/19  I explained to her the 3 month policy and she apologized for calling so much  She will make a note to call around 5/20 if she doesn't hear from you  She is awaiting 8/19 to open up at [facility] with Dr Allen Pall  Please call in a couple of weeks  Thank you!

## 2022-05-06 ENCOUNTER — PREP FOR PROCEDURE (OUTPATIENT)
Dept: GASTROENTEROLOGY | Facility: CLINIC | Age: 52
End: 2022-05-06

## 2022-05-06 ENCOUNTER — TELEPHONE (OUTPATIENT)
Dept: GASTROENTEROLOGY | Facility: CLINIC | Age: 52
End: 2022-05-06

## 2022-05-06 DIAGNOSIS — Z86.010 HISTORY OF COLON POLYPS: Primary | ICD-10-CM

## 2022-05-06 NOTE — TELEPHONE ENCOUNTER
Pt called to schedule colonoscopy, She could not go past 8/19 due to being back in school  She chose to schedule on 8/3/22  I informed her that if this was before her recall date it may not be covered by her insurance  She insisted it would be fine  I then advised her to call her insurance because we preauth procedures but this is not a guarantee of payment and they could choose to not pay her claim  She understood

## 2022-05-06 NOTE — TELEPHONE ENCOUNTER
Dmitriy Hernandez 28 Assessment    Name: George Adams  YOB: 1970  Last Height: 5' 1" (1 549 m)  Last weight: 57 2 kg (126 lb)  BMI: 23 8  Procedure: Colonoscopy  Diagnosis: Hx of polyps  Date of procedure: 8/3/22  Prep: Vinicius/Dul  Responsible : Yes  Phone#: ?  Name completing form: Thena Schlatter  Date form completed: 05/06/22    If the patient answers yes to any of these questions, schedule in a hospital  Are you pregnant: No  Do you rely on a wheelchair for mobility: No  Have you been diagnosed with End Stage Renal Disease (ESRD): No  Do you need oxygen during the day: No  Have you had a heart attack or stroke within the past three months: No  Have you had a seizure within the past three months: No  Have you ever been informed by anesthesia that you have a difficult airway: No  Additional Questions  Have you had any cardiac testing or are under the care of a Cardiologist (see cardiac list): No  Cardiac list:   Do you have an implanted cardiac defibrillator: No (Comment:  This patient should be scheduled in the hospital)    Have any bleeding problems, such as anemia or hemophilia (If patient has H&H result below 8, schedule in hospital   H&H must be within 30 days of procedure): No    Had an organ transplant within the past 3 months: No    Do you have any present infections: No  Do you get short of breath when walking a few blocks: No  Have you been diagnosed with diabetes: No  Comments (provide cardiac provider information if applicable):

## 2022-08-26 ENCOUNTER — TELEPHONE (OUTPATIENT)
Dept: GASTROENTEROLOGY | Facility: AMBULARY SURGERY CENTER | Age: 52
End: 2022-08-26

## 2022-08-26 NOTE — TELEPHONE ENCOUNTER
Pt calling to schedule recall colon with Dr Barrera    CALL BACK #   [phone]    Will be off work 11-10-22 and 11-11-22

## 2022-08-29 ENCOUNTER — TELEPHONE (OUTPATIENT)
Dept: GASTROENTEROLOGY | Facility: CLINIC | Age: 52
End: 2022-08-29

## 2022-08-29 NOTE — TELEPHONE ENCOUNTER
Dmitriy Hernandez 27 Assessment    Name: Meir Perkins  YOB: 1970  Last Height: 5' 1" (1 549 m)  Last weight: 57 2 kg (126 lb)  BMI: None  Procedure: colon  Diagnosis: polyps  Date of procedure: 11/10/22  Prep: miralax and dul  Responsible : yes  Phone#: [phone]  Name completing form: Conrado Ware  Date form completed: 08/29/22      If the patient answers yes to any of these questions, schedule in a hospital  Are you pregnant: No  Do you rely on a wheelchair for mobility: No  Have you been diagnosed with End Stage Renal Disease (ESRD): No  Do you need oxygen during the day: No  Have you had a heart attack or stroke within the past three months: No  Have you had a seizure within the past three months: No  Have you ever been informed by anesthesia that you have a difficult airway: No  Additional Questions  Have you had any cardiac testing or are under the care of a Cardiologist (see cardiac list): No  Cardiac list:   Do you have an implanted cardiac defibrillator: No (Comment:  This patient should be scheduled in the hospital)    Have any bleeding problems, such as anemia or hemophilia (If patient has H&H result below 8, schedule in hospital   H&H must be within 30 days of procedure): No    Had an organ transplant within the past 3 months: No    Do you have any present infections: No  Do you get short of breath when walking a few blocks: No  Have you been diagnosed with diabetes: No  Comments (provide cardiac provider information if applicable):

## 2022-11-03 ENCOUNTER — TELEPHONE (OUTPATIENT)
Dept: GASTROENTEROLOGY | Facility: CLINIC | Age: 52
End: 2022-11-03

## 2022-11-03 NOTE — TELEPHONE ENCOUNTER
Spoke to pt confirming pt's colonoscopy scheduled on 11/10/22 at [facility] with Dr Plata  Informed [facility] would be calling 1-2 days prior with the arrival time  Informed of clear liquid diet day prior as well as the bowel cleansing preparation  Informed would need a  the day of the procedure due to being under sedation  Pt has instructions and did not have any questions  Advised pt to contact insurance if has any questions regarding coverage for procedure

## 2022-11-10 ENCOUNTER — HOSPITAL ENCOUNTER (OUTPATIENT)
Dept: GASTROENTEROLOGY | Facility: AMBULATORY SURGERY CENTER | Age: 52
Discharge: HOME | End: 2022-11-10

## 2022-11-10 ENCOUNTER — ANESTHESIA EVENT (OUTPATIENT)
Dept: GASTROENTEROLOGY | Facility: AMBULATORY SURGERY CENTER | Age: 52
End: 2022-11-10

## 2022-11-10 ENCOUNTER — ANESTHESIA (OUTPATIENT)
Dept: GASTROENTEROLOGY | Facility: AMBULATORY SURGERY CENTER | Age: 52
End: 2022-11-10

## 2022-11-10 VITALS
HEIGHT: 60 IN | BODY MASS INDEX: 24.54 KG/M2 | TEMPERATURE: 97.1 F | HEART RATE: 83 BPM | RESPIRATION RATE: 18 BRPM | DIASTOLIC BLOOD PRESSURE: 73 MMHG | WEIGHT: 125 LBS | OXYGEN SATURATION: 100 % | SYSTOLIC BLOOD PRESSURE: 112 MMHG

## 2022-11-10 DIAGNOSIS — Z86.010 HISTORY OF COLON POLYPS: ICD-10-CM

## 2022-11-10 DIAGNOSIS — K64.8 INTERNAL PROLAPSED HEMORRHOIDS: Primary | ICD-10-CM

## 2022-11-10 RX ORDER — PROPOFOL 10 MG/ML
INJECTION, EMULSION INTRAVENOUS AS NEEDED
Status: DISCONTINUED | OUTPATIENT
Start: 2022-11-10 | End: 2022-11-10

## 2022-11-10 RX ORDER — SODIUM CHLORIDE, SODIUM LACTATE, POTASSIUM CHLORIDE, CALCIUM CHLORIDE 600; 310; 30; 20 MG/100ML; MG/100ML; MG/100ML; MG/100ML
INJECTION, SOLUTION INTRAVENOUS CONTINUOUS PRN
Status: DISCONTINUED | OUTPATIENT
Start: 2022-11-10 | End: 2022-11-10

## 2022-11-10 RX ORDER — LIDOCAINE HYDROCHLORIDE 20 MG/ML
INJECTION, SOLUTION EPIDURAL; INFILTRATION; INTRACAUDAL; PERINEURAL AS NEEDED
Status: DISCONTINUED | OUTPATIENT
Start: 2022-11-10 | End: 2022-11-10

## 2022-11-10 RX ADMIN — PROPOFOL 30 MG: 10 INJECTION, EMULSION INTRAVENOUS at 07:37

## 2022-11-10 RX ADMIN — PROPOFOL 50 MG: 10 INJECTION, EMULSION INTRAVENOUS at 07:33

## 2022-11-10 RX ADMIN — PROPOFOL 30 MG: 10 INJECTION, EMULSION INTRAVENOUS at 07:35

## 2022-11-10 RX ADMIN — PROPOFOL 40 MG: 10 INJECTION, EMULSION INTRAVENOUS at 07:47

## 2022-11-10 RX ADMIN — PROPOFOL 30 MG: 10 INJECTION, EMULSION INTRAVENOUS at 07:40

## 2022-11-10 RX ADMIN — PROPOFOL 100 MG: 10 INJECTION, EMULSION INTRAVENOUS at 07:31

## 2022-11-10 RX ADMIN — SODIUM CHLORIDE, SODIUM LACTATE, POTASSIUM CHLORIDE, CALCIUM CHLORIDE: 600; 310; 30; 20 INJECTION, SOLUTION INTRAVENOUS at 07:20

## 2022-11-10 RX ADMIN — PROPOFOL 30 MG: 10 INJECTION, EMULSION INTRAVENOUS at 07:42

## 2022-11-10 RX ADMIN — LIDOCAINE HYDROCHLORIDE 100 MG: 20 INJECTION, SOLUTION EPIDURAL; INFILTRATION; INTRACAUDAL; PERINEURAL at 07:31

## 2022-11-10 RX ADMIN — PROPOFOL 20 MG: 10 INJECTION, EMULSION INTRAVENOUS at 07:36

## 2022-11-10 RX ADMIN — PROPOFOL 30 MG: 10 INJECTION, EMULSION INTRAVENOUS at 07:49

## 2022-11-10 NOTE — ANESTHESIA PREPROCEDURE EVALUATION
Procedure:  COLONOSCOPY    Relevant Problems   No relevant active problems        Physical Exam    Airway    Mallampati score: IV  TM Distance: >3 FB  Neck ROM: full     Dental   No notable dental hx     Cardiovascular  Cardiovascular exam normal    Pulmonary  Pulmonary exam normal     Other Findings        Anesthesia Plan  ASA Score- 2     Anesthesia Type- IV sedation with anesthesia with ASA Monitors  Additional Monitors:   Airway Plan:           Plan Factors-Exercise tolerance (METS): >4 METS  Chart reviewed  EKG reviewed  Existing labs reviewed  Patient summary reviewed  Patient is a current smoker  Patient smoked on day of surgery  Induction- intravenous  Postoperative Plan-     Informed Consent- Anesthetic plan and risks discussed with patient

## 2022-11-10 NOTE — H&P
History and Physical - [facility]  Lor Burrell 47 y o  female MRN: [id number]                  HPI: Lor Burrell is a 47y o  year old female who presents for colonoscopy due to history of colon polyp adenomatous in nature  Additionally family history colon cancer father around age 71      REVIEW OF SYSTEMS: Per the HPI, and otherwise unremarkable      Historical Information   Past Medical History:   Diagnosis Date   • Colon polyp    • Hyperlipidemia    • Migraine 2017   • Vertigo      Past Surgical History:   Procedure Laterality Date   • AUGMENTATION MAMMAPLASTY Bilateral    • BREAST BIOPSY Right    •  SECTION     • COLONOSCOPY       Social History   Social History     Substance and Sexual Activity   Alcohol Use Not Currently    Comment: 1 drink per month     Social History     Substance and Sexual Activity   Drug Use Never     Social History     Tobacco Use   Smoking Status Former Smoker   • Packs/day: 0 50   • Years: 20 00   • Pack years: 10 00   • Quit date: 2015   • Years since quittin 1   Smokeless Tobacco Never Used   Tobacco Comment    had quit 6 years ago but started again 2 years ago     Family History   Problem Relation Age of Onset   • Breast cancer Maternal Aunt 61   • Cancer Father 64   • Diabetes Father    • No Known Problems Mother    • No Known Problems Daughter    • No Known Problems Maternal Grandmother    • Stomach cancer Maternal Grandfather    • No Known Problems Paternal Grandmother    • Prostate cancer Paternal Grandfather    • Breast cancer Cousin 37   • No Known Problems Daughter    • No Known Problems Paternal Aunt        Meds/Allergies       Current Outpatient Medications:   •  eszopiclone (LUNESTA) 2 mg tablet  •  meclizine (ANTIVERT) 25 mg tablet  •  ondansetron (ZOFRAN) 4 mg tablet  •  rosuvastatin (CRESTOR) 10 MG tablet  •  topiramate (TOPAMAX) 50 MG tablet    No Known Allergies    Objective     /75   Pulse 88   Temp (!) 97 1 °F (36 2 °C) (Skin)   Resp (!) 1   Ht 5' (1 524 m)   Wt 56 7 kg (125 lb)   SpO2 99%   BMI 24 41 kg/m²       PHYSICAL EXAM    Gen: NAD  Head: NCAT  CV: RRR  CHEST: Clear  ABD: soft, NT/ND  EXT: no edema      ASSESSMENT/PLAN:  This is a 47y o  year old female here for colonoscopy, and she is stable and optimized for her procedure

## 2023-06-23 ENCOUNTER — HOSPITAL ENCOUNTER (OUTPATIENT)
Dept: RADIOLOGY | Facility: HOSPITAL | Age: 53
Discharge: HOME | End: 2023-06-23
Payer: COMMERCIAL

## 2023-06-23 VITALS — BODY MASS INDEX: 25.52 KG/M2 | HEIGHT: 60 IN | WEIGHT: 130 LBS

## 2023-06-23 DIAGNOSIS — Z12.31 ENCOUNTER FOR SCREENING MAMMOGRAM FOR MALIGNANT NEOPLASM OF BREAST: ICD-10-CM

## 2023-06-23 PROCEDURE — 77063 BREAST TOMOSYNTHESIS BI: CPT

## 2023-06-23 PROCEDURE — 77067 SCR MAMMO BI INCL CAD: CPT

## 2023-06-26 ENCOUNTER — OFFICE VISIT (OUTPATIENT)
Dept: LAB | Facility: HOSPITAL | Age: 53
End: 2023-06-26
Attending: INTERNAL MEDICINE
Payer: COMMERCIAL

## 2023-06-26 DIAGNOSIS — G43.001 MIGRAINE WITHOUT AURA AND WITH STATUS MIGRAINOSUS, NOT INTRACTABLE: ICD-10-CM

## 2023-06-26 DIAGNOSIS — Z00.01 ENCOUNTER FOR GENERAL ADULT MEDICAL EXAMINATION WITH ABNORMAL FINDINGS: ICD-10-CM

## 2023-06-26 DIAGNOSIS — E78.2 MIXED HYPERLIPIDEMIA: ICD-10-CM

## 2023-06-26 DIAGNOSIS — K21.9 GASTROESOPHAGEAL REFLUX DISEASE WITHOUT ESOPHAGITIS: ICD-10-CM

## 2023-06-26 LAB
ATRIAL RATE: 74 BPM
P AXIS: 22 DEGREES
PR INTERVAL: 150 MS
QRS AXIS: -9 DEGREES
QRSD INTERVAL: 80 MS
QT INTERVAL: 368 MS
QTC INTERVAL: 408 MS
T WAVE AXIS: 41 DEGREES
VENTRICULAR RATE: 74 BPM

## 2023-06-26 PROCEDURE — 93005 ELECTROCARDIOGRAM TRACING: CPT

## 2023-06-26 PROCEDURE — 93010 ELECTROCARDIOGRAM REPORT: CPT | Performed by: INTERNAL MEDICINE

## 2023-10-27 ENCOUNTER — HOSPITAL ENCOUNTER (OUTPATIENT)
Dept: RADIOLOGY | Facility: HOSPITAL | Age: 53
Discharge: HOME | End: 2023-10-27
Payer: COMMERCIAL

## 2023-10-27 DIAGNOSIS — Z00.00 ROUTINE GENERAL MEDICAL EXAMINATION AT A HEALTH CARE FACILITY: ICD-10-CM

## 2023-10-27 DIAGNOSIS — F17.200 SMOKER: ICD-10-CM

## 2023-10-27 PROCEDURE — 71046 X-RAY EXAM CHEST 2 VIEWS: CPT

## 2024-06-17 ENCOUNTER — HOSPITAL ENCOUNTER (OUTPATIENT)
Dept: RADIOLOGY | Facility: HOSPITAL | Age: 54
Discharge: HOME | End: 2024-06-17
Payer: COMMERCIAL

## 2024-06-17 DIAGNOSIS — R92.30 DENSE BREASTS: ICD-10-CM

## 2024-06-17 PROCEDURE — 76641 ULTRASOUND BREAST COMPLETE: CPT

## 2024-07-30 ENCOUNTER — OFFICE VISIT (OUTPATIENT)
Dept: FAMILY MEDICINE CLINIC | Facility: CLINIC | Age: 54
End: 2024-07-30
Payer: COMMERCIAL

## 2024-07-30 VITALS
SYSTOLIC BLOOD PRESSURE: 104 MMHG | HEIGHT: 60 IN | TEMPERATURE: 97 F | BODY MASS INDEX: 25.72 KG/M2 | HEART RATE: 76 BPM | DIASTOLIC BLOOD PRESSURE: 70 MMHG | WEIGHT: 131 LBS | RESPIRATION RATE: 16 BRPM

## 2024-07-30 DIAGNOSIS — F51.01 PRIMARY INSOMNIA: ICD-10-CM

## 2024-07-30 DIAGNOSIS — Z13.29 SCREENING FOR THYROID DISORDER: ICD-10-CM

## 2024-07-30 DIAGNOSIS — E55.9 VITAMIN D DEFICIENCY: ICD-10-CM

## 2024-07-30 DIAGNOSIS — Z13.6 SCREENING FOR CARDIOVASCULAR CONDITION: ICD-10-CM

## 2024-07-30 DIAGNOSIS — K21.9 GASTROESOPHAGEAL REFLUX DISEASE WITHOUT ESOPHAGITIS: ICD-10-CM

## 2024-07-30 DIAGNOSIS — E78.2 MIXED HYPERLIPIDEMIA: ICD-10-CM

## 2024-07-30 DIAGNOSIS — Z13.21 ENCOUNTER FOR VITAMIN DEFICIENCY SCREENING: ICD-10-CM

## 2024-07-30 DIAGNOSIS — G43.909 MIGRAINE WITHOUT STATUS MIGRAINOSUS, NOT INTRACTABLE, UNSPECIFIED MIGRAINE TYPE: Primary | ICD-10-CM

## 2024-07-30 PROBLEM — R42 VERTIGO: Status: ACTIVE | Noted: 2024-07-30

## 2024-07-30 PROBLEM — K63.5 MULTIPLE POLYPS OF SIGMOID COLON: Status: ACTIVE | Noted: 2023-06-15

## 2024-07-30 PROBLEM — N95.0 PMB (POSTMENOPAUSAL BLEEDING): Status: ACTIVE | Noted: 2018-04-25

## 2024-07-30 PROCEDURE — 3725F SCREEN DEPRESSION PERFORMED: CPT | Performed by: NURSE PRACTITIONER

## 2024-07-30 PROCEDURE — 99203 OFFICE O/P NEW LOW 30 MIN: CPT | Performed by: NURSE PRACTITIONER

## 2024-07-30 RX ORDER — MULTIVITAMIN
1 TABLET ORAL DAILY
COMMUNITY

## 2024-07-30 RX ORDER — OMEPRAZOLE 20 MG/1
20 CAPSULE, DELAYED RELEASE ORAL DAILY
COMMUNITY
Start: 2024-04-28

## 2024-07-30 RX ORDER — ROSUVASTATIN CALCIUM 10 MG/1
10 TABLET, COATED ORAL DAILY
Qty: 90 TABLET | Refills: 1 | Status: CANCELLED | OUTPATIENT
Start: 2024-07-30

## 2024-07-30 RX ORDER — ESZOPICLONE 2 MG/1
2 TABLET, FILM COATED ORAL
Qty: 30 TABLET | Refills: 2 | Status: SHIPPED | OUTPATIENT
Start: 2024-07-30

## 2024-07-30 RX ORDER — ESZOPICLONE 2 MG/1
TABLET, FILM COATED ORAL
Status: CANCELLED | OUTPATIENT
Start: 2024-07-30

## 2024-07-30 RX ORDER — TOPIRAMATE 50 MG/1
50 TABLET, FILM COATED ORAL 2 TIMES DAILY
Qty: 180 TABLET | Refills: 1 | Status: CANCELLED | OUTPATIENT
Start: 2024-07-30

## 2024-07-30 RX ORDER — OMEPRAZOLE 20 MG/1
20 CAPSULE, DELAYED RELEASE ORAL DAILY
Qty: 90 CAPSULE | Refills: 1 | Status: CANCELLED | OUTPATIENT
Start: 2024-07-30

## 2024-07-30 NOTE — ASSESSMENT & PLAN NOTE
Discussed having an endoscopy done when she is due for her next colonoscopy.  Also reviewed long-term risks associated with PPI use.  Consider trial of Pepcid OTC

## 2024-07-30 NOTE — PROGRESS NOTES
Ambulatory Visit  Name: Maki Pastrana      : 1970      MRN: [id number]  Encounter Provider: PROSPER Mcgovern  Encounter Date: 2024   Encounter department: [facility]    Assessment & Plan   1. Migraine without status migrainosus, not intractable, unspecified migraine type  Assessment & Plan:  Stable on Topamax.  Continue same  2. Primary insomnia  Assessment & Plan:  She has been on Lunesta, takes this every night.  Refill sent  Orders:  -     eszopiclone (LUNESTA) 2 mg tablet; Take 1 tablet (2 mg total) by mouth daily at bedtime Take immediately before bedtime  3. Mixed hyperlipidemia  Assessment & Plan:  Order given for repeat labs, continue rosuvastatin 10 mg  Orders:  -     Lipid Panel with Direct LDL reflex; Future  -     Lipid Panel with Direct LDL reflex  4. Vitamin D deficiency  Assessment & Plan:  She does not routinely take vitamin D supplementation.  Order given for repeat labs  5. Encounter for vitamin deficiency screening  -     Vitamin D 25 hydroxy; Future  -     Vitamin D 25 hydroxy  6. Screening for cardiovascular condition  -     Comprehensive metabolic panel; Future  -     CBC and differential; Future  -     Comprehensive metabolic panel  -     CBC and differential  7. Screening for thyroid disorder  -     TSH, 3rd generation with Free T4 reflex; Future  -     TSH, 3rd generation with Free T4 reflex  8. Gastroesophageal reflux disease without esophagitis  Assessment & Plan:  Discussed having an endoscopy done when she is due for her next colonoscopy.  Also reviewed long-term risks associated with PPI use.  Consider trial of Pepcid OTC     History of Present Illness     Here today to establish care.  She has a history of migraine headaches, hyperlipidemia, GERD, and insomnia.  No concerns today.  Up-to-date with cancer screenings and labs.  She does routinely follow-up with any specialists aside from GI for her colon cancer screening and GYN.          Review of  "Systems   Constitutional: Negative.    Respiratory: Negative.     Cardiovascular: Negative.    Gastrointestinal: Negative.    Neurological: Negative.        Objective     /70   Pulse 76   Temp (!) 97 °F (36.1 °C)   Resp 16   Ht 4' 11.75\" (1.518 m)   Wt 59.4 kg (131 lb)   BMI 25.80 kg/m²     Physical Exam  Vitals and nursing note reviewed.   Constitutional:       General: She is not in acute distress.     Appearance: Normal appearance.   HENT:      Head: Normocephalic and atraumatic.   Eyes:      Conjunctiva/sclera: Conjunctivae normal.   Neck:      Vascular: No carotid bruit.   Cardiovascular:      Rate and Rhythm: Normal rate and regular rhythm.      Pulses: Normal pulses.      Heart sounds: Normal heart sounds. No murmur heard.  Pulmonary:      Effort: Pulmonary effort is normal.      Breath sounds: Normal breath sounds.   Skin:     General: Skin is warm and dry.   Neurological:      Mental Status: She is alert.   Psychiatric:         Mood and Affect: Mood normal.         Behavior: Behavior normal.       Administrative Statements           "

## 2024-10-04 DIAGNOSIS — F51.01 PRIMARY INSOMNIA: ICD-10-CM

## 2024-10-04 RX ORDER — ESZOPICLONE 2 MG/1
2 TABLET, FILM COATED ORAL
Qty: 30 TABLET | Refills: 1 | Status: SHIPPED | OUTPATIENT
Start: 2024-10-04

## 2024-10-14 DIAGNOSIS — E55.9 VITAMIN D DEFICIENCY: Primary | ICD-10-CM

## 2024-10-14 DIAGNOSIS — E78.2 MIXED HYPERLIPIDEMIA: ICD-10-CM

## 2024-10-14 DIAGNOSIS — G43.909 MIGRAINE WITHOUT STATUS MIGRAINOSUS, NOT INTRACTABLE, UNSPECIFIED MIGRAINE TYPE: ICD-10-CM

## 2024-10-15 RX ORDER — ROSUVASTATIN CALCIUM 10 MG/1
10 TABLET, COATED ORAL DAILY
Qty: 90 TABLET | Refills: 1 | Status: SHIPPED | OUTPATIENT
Start: 2024-10-15

## 2024-11-07 ENCOUNTER — HOSPITAL ENCOUNTER (OUTPATIENT)
Dept: RADIOLOGY | Facility: HOSPITAL | Age: 54
Discharge: HOME | End: 2024-11-07
Payer: COMMERCIAL

## 2024-11-07 VITALS — WEIGHT: 130 LBS | BODY MASS INDEX: 25.52 KG/M2 | HEIGHT: 60 IN

## 2024-11-07 DIAGNOSIS — R92.30 DENSE BREASTS: ICD-10-CM

## 2024-11-07 DIAGNOSIS — Z12.31 SCREENING MAMMOGRAM, ENCOUNTER FOR: ICD-10-CM

## 2024-11-07 PROCEDURE — 77067 SCR MAMMO BI INCL CAD: CPT

## 2024-11-07 PROCEDURE — 77063 BREAST TOMOSYNTHESIS BI: CPT

## 2025-01-09 DIAGNOSIS — F51.01 PRIMARY INSOMNIA: ICD-10-CM

## 2025-01-10 RX ORDER — ESZOPICLONE 2 MG/1
2 TABLET, FILM COATED ORAL
Qty: 30 TABLET | Refills: 1 | Status: SHIPPED | OUTPATIENT
Start: 2025-01-10

## 2025-03-05 DIAGNOSIS — K21.9 GASTROESOPHAGEAL REFLUX DISEASE WITHOUT ESOPHAGITIS: ICD-10-CM

## 2025-03-05 DIAGNOSIS — G43.909 MIGRAINE WITHOUT STATUS MIGRAINOSUS, NOT INTRACTABLE, UNSPECIFIED MIGRAINE TYPE: Primary | ICD-10-CM

## 2025-03-06 NOTE — TELEPHONE ENCOUNTER
Pt stated she has only seen Sharmaine once to [facility]. From Dr. Mcdermott, she didn't discuss migraine because they are something she has dealt with for years and she was under the impression that the estab visit was her physical. Please  verify visit then we will call pt back if she still needs to schedule.

## 2025-03-07 ENCOUNTER — TELEPHONE (OUTPATIENT)
Age: 55
End: 2025-03-07

## 2025-03-07 RX ORDER — OMEPRAZOLE 20 MG/1
20 CAPSULE, DELAYED RELEASE ORAL DAILY
Qty: 90 CAPSULE | Refills: 1 | Status: SHIPPED | OUTPATIENT
Start: 2025-03-07

## 2025-03-07 RX ORDER — MECLIZINE HYDROCHLORIDE 25 MG/1
25 TABLET ORAL EVERY 8 HOURS PRN
Qty: 30 TABLET | Refills: 2 | Status: SHIPPED | OUTPATIENT
Start: 2025-03-07

## 2025-03-07 NOTE — TELEPHONE ENCOUNTER
PA meclizine (ANTIVERT) 25 mg SUBMITTED     to [id number] [facility] [place]     via    []CMM-KEY:    [x]Surescripts   []Availity-Auth ID #  NDC #    []Faxed to plan   []Other website    []Phone call Case ID #      []PA sent as URGENT    All office notes, labs and other pertaining documents and studies sent. Clinical questions answered. Awaiting determination from insurance company.     Turnaround time for your insurance to make a decision on your Prior Authorization can take 7-21 business days.

## 2025-03-10 NOTE — TELEPHONE ENCOUNTER
PA meclizine (ANTIVERT) 25 mg DENIED    Reason:(Screenshot if applicable)        Message sent to office clinical pool Yes    Denial letter scanned into Media Yes    Appeal started No (Provider will need to decide if appeal is warranted and send clinical documentation to Prior Authorization Team for initiation.)    **Please follow up with your patient regarding denial and next steps**

## 2025-03-13 DIAGNOSIS — F51.01 PRIMARY INSOMNIA: ICD-10-CM

## 2025-03-14 RX ORDER — ESZOPICLONE 2 MG/1
2 TABLET, FILM COATED ORAL
Qty: 30 TABLET | Refills: 0 | Status: SHIPPED | OUTPATIENT
Start: 2025-03-14

## 2025-04-09 ENCOUNTER — PATIENT MESSAGE (OUTPATIENT)
Dept: FAMILY MEDICINE CLINIC | Facility: CLINIC | Age: 55
End: 2025-04-09

## 2025-04-09 DIAGNOSIS — Z13.21 ENCOUNTER FOR VITAMIN DEFICIENCY SCREENING: Primary | ICD-10-CM

## 2025-04-09 DIAGNOSIS — Z13.6 SCREENING FOR CARDIOVASCULAR CONDITION: ICD-10-CM

## 2025-04-09 DIAGNOSIS — E55.9 VITAMIN D DEFICIENCY: ICD-10-CM

## 2025-04-09 DIAGNOSIS — Z13.29 SCREENING FOR THYROID DISORDER: ICD-10-CM

## 2025-04-09 DIAGNOSIS — E78.2 MIXED HYPERLIPIDEMIA: ICD-10-CM

## 2025-04-10 ENCOUNTER — RA CDI HCC (OUTPATIENT)
Dept: OTHER | Facility: HOSPITAL | Age: 55
End: 2025-04-10

## 2025-04-10 NOTE — PROGRESS NOTES
HCC coding opportunities       Chart reviewed, no opportunity found: CHART REVIEWED, NO OPPORTUNITY FOUND        Patients Insurance        Commercial Insurance: [employer]

## 2025-04-12 LAB
25(OH)D3+25(OH)D2 SERPL-MCNC: 13 NG/ML (ref 30–100)
ALBUMIN SERPL-MCNC: 4.3 G/DL (ref 3.5–5.7)
ALP SERPL-CCNC: 58 U/L (ref 35–120)
ALT SERPL-CCNC: 17 U/L
ANION GAP SERPL CALCULATED.3IONS-SCNC: 8 MMOL/L (ref 3–11)
AST SERPL-CCNC: 13 U/L
BASOPHILS # BLD AUTO: 0 THOU/CMM (ref 0–0.1)
BASOPHILS NFR BLD AUTO: 1 %
BILIRUB SERPL-MCNC: 0.5 MG/DL (ref 0.2–1)
BUN SERPL-MCNC: 14 MG/DL (ref 7–25)
CALCIUM SERPL-MCNC: 9.3 MG/DL (ref 8.5–10.5)
CHLORIDE SERPL-SCNC: 109 MMOL/L (ref 100–109)
CHOLEST SERPL-MCNC: 192 MG/DL
CHOLEST/HDLC SERPL: 2.6 {RATIO}
CO2 SERPL-SCNC: 27 MMOL/L (ref 21–31)
CREAT SERPL-MCNC: 0.7 MG/DL (ref 0.4–1.1)
CYTOLOGY CMNT CVX/VAG CYTO-IMP: NORMAL
DIFFERENTIAL METHOD BLD: ABNORMAL
EOSINOPHIL # BLD AUTO: 0.1 THOU/CMM (ref 0–0.5)
EOSINOPHIL NFR BLD AUTO: 1 %
ERYTHROCYTE [DISTWIDTH] IN BLOOD BY AUTOMATED COUNT: 13 % (ref 12–16)
GFR/BSA.PRED SERPLBLD CYS-BASED-ARV: 103 ML/MIN/{1.73_M2}
GLUCOSE SERPL-MCNC: 94 MG/DL (ref 65–99)
HCT VFR BLD AUTO: 42.7 % (ref 35–43)
HDLC SERPL-MCNC: 73 MG/DL (ref 23–92)
HGB BLD-MCNC: 14.5 G/DL (ref 11.5–14.5)
LDLC SERPL CALC-MCNC: 95 MG/DL
LYMPHOCYTES # BLD AUTO: 2.2 THOU/CMM (ref 1–3)
LYMPHOCYTES NFR BLD AUTO: 42 %
MCH RBC QN AUTO: 30.2 PG (ref 26–34)
MCHC RBC AUTO-ENTMCNC: 34.1 G/DL (ref 32–37)
MCV RBC AUTO: 89 FL (ref 80–100)
MONOCYTES # BLD AUTO: 0.4 THOU/CMM (ref 0.3–1)
MONOCYTES NFR BLD AUTO: 7 %
NEUTROPHILS # BLD AUTO: 2.6 THOU/CMM (ref 1.8–7.8)
NEUTROPHILS NFR BLD AUTO: 49 %
NONHDLC SERPL-MCNC: 119 MG/DL
PLATELET # BLD AUTO: 266 THOU/CMM (ref 140–350)
PMV BLD REES-ECKER: 7.3 FL (ref 7.5–11.3)
POTASSIUM SERPL-SCNC: 4.2 MMOL/L (ref 3.5–5.2)
PROT SERPL-MCNC: 6.3 G/DL (ref 6.3–8.3)
RBC # BLD AUTO: 4.81 MILL/CMM (ref 3.7–4.7)
SODIUM SERPL-SCNC: 144 MMOL/L (ref 135–145)
TRIGL SERPL-MCNC: 121 MG/DL
TSH SERPL-ACNC: 0.72 UIU/ML (ref 0.45–5.33)
WBC # BLD AUTO: 5.4 THOU/CMM (ref 4–10)

## 2025-04-14 ENCOUNTER — RESULTS FOLLOW-UP (OUTPATIENT)
Dept: FAMILY MEDICINE CLINIC | Facility: CLINIC | Age: 55
End: 2025-04-14

## 2025-04-17 ENCOUNTER — OFFICE VISIT (OUTPATIENT)
Dept: FAMILY MEDICINE CLINIC | Facility: CLINIC | Age: 55
End: 2025-04-17
Payer: COMMERCIAL

## 2025-04-17 VITALS
BODY MASS INDEX: 26.89 KG/M2 | HEIGHT: 59 IN | RESPIRATION RATE: 16 BRPM | DIASTOLIC BLOOD PRESSURE: 62 MMHG | WEIGHT: 133.4 LBS | TEMPERATURE: 98.9 F | HEART RATE: 76 BPM | SYSTOLIC BLOOD PRESSURE: 100 MMHG

## 2025-04-17 DIAGNOSIS — E78.2 MIXED HYPERLIPIDEMIA: ICD-10-CM

## 2025-04-17 DIAGNOSIS — Z11.59 NEED FOR HEPATITIS C SCREENING TEST: ICD-10-CM

## 2025-04-17 DIAGNOSIS — Z00.00 ROUTINE ADULT HEALTH MAINTENANCE: Primary | ICD-10-CM

## 2025-04-17 DIAGNOSIS — G43.909 MIGRAINE WITHOUT STATUS MIGRAINOSUS, NOT INTRACTABLE, UNSPECIFIED MIGRAINE TYPE: ICD-10-CM

## 2025-04-17 DIAGNOSIS — L30.9 DERMATITIS: ICD-10-CM

## 2025-04-17 DIAGNOSIS — Z12.31 SCREENING MAMMOGRAM FOR BREAST CANCER: ICD-10-CM

## 2025-04-17 DIAGNOSIS — Z23 ENCOUNTER FOR IMMUNIZATION: ICD-10-CM

## 2025-04-17 DIAGNOSIS — E55.9 VITAMIN D DEFICIENCY: ICD-10-CM

## 2025-04-17 DIAGNOSIS — F51.01 PRIMARY INSOMNIA: ICD-10-CM

## 2025-04-17 PROCEDURE — 99396 PREV VISIT EST AGE 40-64: CPT | Performed by: NURSE PRACTITIONER

## 2025-04-17 PROCEDURE — 90471 IMMUNIZATION ADMIN: CPT

## 2025-04-17 PROCEDURE — 90715 TDAP VACCINE 7 YRS/> IM: CPT

## 2025-04-17 RX ORDER — CLOTRIMAZOLE AND BETAMETHASONE DIPROPIONATE 10; .64 MG/G; MG/G
CREAM TOPICAL 2 TIMES DAILY
Qty: 45 G | Refills: 0 | Status: SHIPPED | OUTPATIENT
Start: 2025-04-17

## 2025-04-17 RX ORDER — ESZOPICLONE 2 MG/1
2 TABLET, FILM COATED ORAL
Qty: 30 TABLET | Refills: 2 | Status: SHIPPED | OUTPATIENT
Start: 2025-04-17

## 2025-04-17 RX ORDER — ERGOCALCIFEROL 1.25 MG/1
50000 CAPSULE, LIQUID FILLED ORAL WEEKLY
Qty: 8 CAPSULE | Refills: 0 | Status: SHIPPED | OUTPATIENT
Start: 2025-04-17

## 2025-04-17 RX ORDER — ROSUVASTATIN CALCIUM 10 MG/1
10 TABLET, COATED ORAL DAILY
Qty: 90 TABLET | Refills: 3 | Status: SHIPPED | OUTPATIENT
Start: 2025-04-17

## 2025-04-17 NOTE — ASSESSMENT & PLAN NOTE
Stable on statin   Orders:  •  rosuvastatin (CRESTOR) 10 MG tablet; Take 1 tablet (10 mg total) by mouth daily  •  Comprehensive metabolic panel; Future  •  Lipid Panel with Direct LDL reflex; Future

## 2025-04-17 NOTE — ASSESSMENT & PLAN NOTE
Cont Lunesta   Orders:  •  eszopiclone (LUNESTA) 2 mg tablet; Take 1 tablet (2 mg total) by mouth daily at bedtime Take immediately before bedtime

## 2025-04-17 NOTE — ASSESSMENT & PLAN NOTE
Advised to resume daily supplementation once she completes rx  Orders:  •  ergocalciferol (VITAMIN D2) 50,000 units; Take 1 capsule (50,000 Units total) by mouth once a week

## 2025-04-17 NOTE — PROGRESS NOTES
Adult Annual Physical  Name: Maki Pastrana      : 1970      MRN: [id number]  Encounter Provider: PROSPER Mcgovern  Encounter Date: 2025   Encounter department: [facility]    :  Assessment & Plan  Routine adult health maintenance         Vitamin D deficiency  Advised to resume daily supplementation once she completes rx  Orders:  •  ergocalciferol (VITAMIN D2) 50,000 units; Take 1 capsule (50,000 Units total) by mouth once a week    Mixed hyperlipidemia  Stable on statin   Orders:  •  rosuvastatin (CRESTOR) 10 MG tablet; Take 1 tablet (10 mg total) by mouth daily  •  Comprehensive metabolic panel; Future  •  Lipid Panel with Direct LDL reflex; Future    Primary insomnia  Cont Lunesta   Orders:  •  eszopiclone (LUNESTA) 2 mg tablet; Take 1 tablet (2 mg total) by mouth daily at bedtime Take immediately before bedtime    Screening mammogram for breast cancer    Orders:  •  Mammo screening bilateral w 3d and cad; Future    Encounter for immunization    Orders:  •  TDAP VACCINE GREATER THAN OR EQUAL TO 6YO IM    Migraine without status migrainosus, not intractable, unspecified migraine type  Stable on Topamax       Need for hepatitis C screening test    Orders:  •  Hepatitis C antibody; Future    Dermatitis    Orders:  •  clotrimazole-betamethasone (LOTRISONE) 1-0.05 % cream; Apply topically 2 (two) times a day        Preventive Screenings:  - Diabetes Screening: screening up-to-date  - Cholesterol Screening: screening not indicated and has hyperlipidemia   - Hepatitis C screening: patient agrees to screening and orders placed   - HIV screening: screening up-to-date   - Cervical cancer screening: screening up-to-date   - Breast cancer screening: screening up-to-date   - Colon cancer screening: screening up-to-date   - Lung cancer screening: screening not indicated     Immunizations:  - Immunizations due: Tdap, Zoster (Shingrix) and Shingrix deferred today    Counseling/Anticipatory  Guidance:    - Diet: discussed recommendations for a healthy/well-balanced diet.   - Exercise: the importance of regular exercise/physical activity was discussed. Recommend exercise 3-5 times per week for at least 30 minutes.          History of Present Illness     Adult Annual Physical:  Patient presents for annual physical. Here today for CPE  Vitamin D- not consistent with supplementation   Meds stable.   Has a rash base of right toes that picks off.  .     Diet and Physical Activity:  - Diet/Nutrition: no special diet.  - Exercise: no formal exercise.    General Health:  - Sleep: sleeps well. sleeps well Lunesta  - Hearing: normal hearing bilateral ears.  - Vision: most recent eye exam < 1 year ago.  - Dental: regular dental visits and brushes teeth twice daily.    /GYN Health:  - Follows with GYN: yes.   - Menopause: postmenopausal.   Pap up todate   Review of Systems   Constitutional: Negative.    Respiratory: Negative.     Cardiovascular: Negative.    Gastrointestinal: Negative.    Genitourinary: Negative.    Neurological: Negative.      Current Outpatient Medications on File Prior to Visit   Medication Sig Dispense Refill   • Daily Multiple Vitamins tablet Take 1 tablet by mouth daily     • meclizine (ANTIVERT) 25 mg tablet Take 1 tablet (25 mg total) by mouth every 8 (eight) hours as needed for dizziness PRN use 30 tablet 2   • omeprazole (PriLOSEC) 20 mg delayed release capsule Take 1 capsule (20 mg total) by mouth daily 90 capsule 1   • ondansetron (ZOFRAN) 4 mg tablet PRN use  0   • topiramate (TOPAMAX) 50 MG tablet take 1 tablet by mouth twice a day     • [DISCONTINUED] eszopiclone (LUNESTA) 2 mg tablet Take 1 tablet (2 mg total) by mouth daily at bedtime Take immediately before bedtime 30 tablet 0   • [DISCONTINUED] rosuvastatin (CRESTOR) 10 MG tablet Take 1 tablet (10 mg total) by mouth daily 90 tablet 1     No current facility-administered medications on file prior to visit.      Social History  "    Tobacco Use   • Smoking status: Some Days     Current packs/day: 0.00     Average packs/day: 0.5 packs/day for 20.0 years (10.0 ttl pk-yrs)     Types: Cigarettes     Start date: 1995     Last attempt to quit: 2015     Years since quittin.5     Passive exposure: Never   • Smokeless tobacco: Never   • Tobacco comments:     had quit 6 years ago but started again 2 years ago   Vaping Use   • Vaping status: Never Used   Substance and Sexual Activity   • Alcohol use: Not Currently     Comment: 1 drink per month   • Drug use: Never   • Sexual activity: Not on file       Objective   /62   Pulse 76   Temp 98.9 °F (37.2 °C)   Resp 16   Ht 4' 11.45\" (1.51 m)   Wt 60.5 kg (133 lb 6.4 oz)   BMI 26.54 kg/m²     Physical Exam  Vitals and nursing note reviewed.   Constitutional:       Appearance: Normal appearance. She is well-developed.   HENT:      Head: Normocephalic and atraumatic.      Right Ear: Tympanic membrane and external ear normal.      Left Ear: Tympanic membrane and external ear normal.      Nose: Nose normal.      Mouth/Throat:      Pharynx: Oropharynx is clear.   Eyes:      Extraocular Movements: Extraocular movements intact.      Conjunctiva/sclera: Conjunctivae normal.      Pupils: Pupils are equal, round, and reactive to light.   Neck:      Thyroid: No thyromegaly.      Vascular: No carotid bruit.   Cardiovascular:      Rate and Rhythm: Normal rate and regular rhythm.      Pulses: Normal pulses.      Heart sounds: Normal heart sounds. No murmur heard.  Pulmonary:      Effort: Pulmonary effort is normal.      Breath sounds: Normal breath sounds.   Abdominal:      General: Bowel sounds are normal. There is no distension.      Palpations: Abdomen is soft.      Tenderness: There is no abdominal tenderness.   Musculoskeletal:         General: No deformity. Normal range of motion.      Cervical back: Normal range of motion and neck supple.   Lymphadenopathy:      Cervical: No cervical " adenopathy.   Skin:     General: Skin is warm and dry.      Capillary Refill: Capillary refill takes less than 2 seconds.      Comments: Raised, dry hyperkeratosis base of toes right foot    Neurological:      Mental Status: She is alert.      Sensory: No sensory deficit.      Motor: No abnormal muscle tone.      Coordination: Coordination normal.      Deep Tendon Reflexes: Reflexes normal.   Psychiatric:         Mood and Affect: Mood normal.         Behavior: Behavior normal.

## 2025-06-08 DIAGNOSIS — E55.9 VITAMIN D DEFICIENCY: ICD-10-CM

## 2025-06-09 RX ORDER — ERGOCALCIFEROL 1.25 MG/1
50000 CAPSULE, LIQUID FILLED ORAL WEEKLY
Qty: 8 CAPSULE | Refills: 0 | OUTPATIENT
Start: 2025-06-09

## 2025-07-21 DIAGNOSIS — F51.01 PRIMARY INSOMNIA: ICD-10-CM

## 2025-07-21 DIAGNOSIS — G43.909 MIGRAINE WITHOUT STATUS MIGRAINOSUS, NOT INTRACTABLE, UNSPECIFIED MIGRAINE TYPE: Primary | ICD-10-CM

## 2025-07-21 RX ORDER — TOPIRAMATE 50 MG/1
50 TABLET, FILM COATED ORAL 2 TIMES DAILY
Status: CANCELLED | OUTPATIENT
Start: 2025-07-21

## 2025-07-21 RX ORDER — ESZOPICLONE 2 MG/1
2 TABLET, FILM COATED ORAL
Qty: 30 TABLET | Refills: 0 | Status: SHIPPED | OUTPATIENT
Start: 2025-07-21

## 2025-07-21 NOTE — TELEPHONE ENCOUNTER
Message sent via [employer].     Good morning  I put a med refill request in for lunesta but could not put one in for my topamax 50mg. I also need a refill for this. Thank you.

## 2025-07-21 NOTE — TELEPHONE ENCOUNTER
Filled as covering provider  Controlled Substance Review  [place] PDMP or [place]  reviewed: No red flags were identified; safe to proceed with prescription..  PROSPER Pitt

## 2025-07-22 RX ORDER — TOPIRAMATE 50 MG/1
50 TABLET, FILM COATED ORAL 2 TIMES DAILY
Qty: 60 TABLET | Refills: 0 | Status: SHIPPED | OUTPATIENT
Start: 2025-07-22

## 2025-08-19 DIAGNOSIS — F51.01 PRIMARY INSOMNIA: ICD-10-CM

## 2025-08-20 RX ORDER — ESZOPICLONE 2 MG/1
2 TABLET, FILM COATED ORAL
Qty: 30 TABLET | Refills: 3 | Status: SHIPPED | OUTPATIENT
Start: 2025-08-20